# Patient Record
Sex: FEMALE | Race: WHITE | NOT HISPANIC OR LATINO | URBAN - METROPOLITAN AREA
[De-identification: names, ages, dates, MRNs, and addresses within clinical notes are randomized per-mention and may not be internally consistent; named-entity substitution may affect disease eponyms.]

---

## 2021-01-05 ENCOUNTER — INPATIENT (INPATIENT)
Facility: HOSPITAL | Age: 31
LOS: 0 days | Discharge: HOME | End: 2021-01-06
Attending: SPECIALIST | Admitting: SPECIALIST

## 2021-01-05 ENCOUNTER — TRANSCRIPTION ENCOUNTER (OUTPATIENT)
Age: 31
End: 2021-01-05

## 2021-01-05 VITALS — TEMPERATURE: 99 F

## 2021-01-05 DIAGNOSIS — O26.899 OTHER SPECIFIED PREGNANCY RELATED CONDITIONS, UNSPECIFIED TRIMESTER: ICD-10-CM

## 2021-01-05 DIAGNOSIS — Z3A.00 WEEKS OF GESTATION OF PREGNANCY NOT SPECIFIED: ICD-10-CM

## 2021-01-05 LAB
AMPHET UR-MCNC: NEGATIVE — SIGNIFICANT CHANGE UP
BARBITURATES UR SCN-MCNC: NEGATIVE — SIGNIFICANT CHANGE UP
BASOPHILS # BLD AUTO: 0.03 K/UL — SIGNIFICANT CHANGE UP (ref 0–0.2)
BASOPHILS NFR BLD AUTO: 0.2 % — SIGNIFICANT CHANGE UP (ref 0–2)
BENZODIAZ UR-MCNC: NEGATIVE — SIGNIFICANT CHANGE UP
BLD GP AB SCN SERPL QL: NEGATIVE — SIGNIFICANT CHANGE UP
COCAINE METAB.OTHER UR-MCNC: NEGATIVE — SIGNIFICANT CHANGE UP
CREATININE URINE RESULT, DAU: 143 MG/DL — SIGNIFICANT CHANGE UP
EOSINOPHIL # BLD AUTO: 0.06 K/UL — SIGNIFICANT CHANGE UP (ref 0–0.5)
EOSINOPHIL NFR BLD AUTO: 0.5 % — SIGNIFICANT CHANGE UP (ref 0–6)
HCT VFR BLD CALC: 32.3 % — LOW (ref 34.5–45)
HGB BLD-MCNC: 9.7 G/DL — LOW (ref 11.5–15.5)
IANC: 10.44 K/UL — HIGH (ref 1.5–8.5)
IMM GRANULOCYTES NFR BLD AUTO: 0.9 % — SIGNIFICANT CHANGE UP (ref 0–1.5)
LYMPHOCYTES # BLD AUTO: 1.54 K/UL — SIGNIFICANT CHANGE UP (ref 1–3.3)
LYMPHOCYTES # BLD AUTO: 12 % — LOW (ref 13–44)
MCHC RBC-ENTMCNC: 22.1 PG — LOW (ref 27–34)
MCHC RBC-ENTMCNC: 30 GM/DL — LOW (ref 32–36)
MCV RBC AUTO: 73.6 FL — LOW (ref 80–100)
METHADONE UR-MCNC: NEGATIVE — SIGNIFICANT CHANGE UP
MONOCYTES # BLD AUTO: 0.62 K/UL — SIGNIFICANT CHANGE UP (ref 0–0.9)
MONOCYTES NFR BLD AUTO: 4.8 % — SIGNIFICANT CHANGE UP (ref 2–14)
NEUTROPHILS # BLD AUTO: 10.44 K/UL — HIGH (ref 1.8–7.4)
NEUTROPHILS NFR BLD AUTO: 81.6 % — HIGH (ref 43–77)
NRBC # BLD: 0 /100 WBCS — SIGNIFICANT CHANGE UP
NRBC # FLD: 0 K/UL — SIGNIFICANT CHANGE UP
OPIATES UR-MCNC: NEGATIVE — SIGNIFICANT CHANGE UP
OXYCODONE UR-MCNC: NEGATIVE — SIGNIFICANT CHANGE UP
PCP SPEC-MCNC: SIGNIFICANT CHANGE UP
PCP UR-MCNC: NEGATIVE — SIGNIFICANT CHANGE UP
PLATELET # BLD AUTO: 216 K/UL — SIGNIFICANT CHANGE UP (ref 150–400)
RBC # BLD: 4.39 M/UL — SIGNIFICANT CHANGE UP (ref 3.8–5.2)
RBC # FLD: 20.9 % — HIGH (ref 10.3–14.5)
RH IG SCN BLD-IMP: POSITIVE — SIGNIFICANT CHANGE UP
RH IG SCN BLD-IMP: POSITIVE — SIGNIFICANT CHANGE UP
SARS-COV-2 RNA SPEC QL NAA+PROBE: SIGNIFICANT CHANGE UP
T PALLIDUM AB TITR SER: NEGATIVE — SIGNIFICANT CHANGE UP
THC UR QL: NEGATIVE — SIGNIFICANT CHANGE UP
WBC # BLD: 12.8 K/UL — HIGH (ref 3.8–10.5)
WBC # FLD AUTO: 12.8 K/UL — HIGH (ref 3.8–10.5)

## 2021-01-05 RX ORDER — SIMETHICONE 80 MG/1
80 TABLET, CHEWABLE ORAL EVERY 4 HOURS
Refills: 0 | Status: DISCONTINUED | OUTPATIENT
Start: 2021-01-05 | End: 2021-01-05

## 2021-01-05 RX ORDER — MAGNESIUM HYDROXIDE 400 MG/1
30 TABLET, CHEWABLE ORAL
Refills: 0 | Status: DISCONTINUED | OUTPATIENT
Start: 2021-01-05 | End: 2021-01-05

## 2021-01-05 RX ORDER — CITRIC ACID/SODIUM CITRATE 300-500 MG
15 SOLUTION, ORAL ORAL EVERY 6 HOURS
Refills: 0 | Status: DISCONTINUED | OUTPATIENT
Start: 2021-01-05 | End: 2021-01-05

## 2021-01-05 RX ORDER — OXYCODONE HYDROCHLORIDE 5 MG/1
5 TABLET ORAL ONCE
Refills: 0 | Status: DISCONTINUED | OUTPATIENT
Start: 2021-01-05 | End: 2021-01-05

## 2021-01-05 RX ORDER — OXYTOCIN 10 UNIT/ML
333.33 VIAL (ML) INJECTION
Qty: 20 | Refills: 0 | Status: DISCONTINUED | OUTPATIENT
Start: 2021-01-05 | End: 2021-01-05

## 2021-01-05 RX ORDER — DIPHENHYDRAMINE HCL 50 MG
25 CAPSULE ORAL EVERY 6 HOURS
Refills: 0 | Status: DISCONTINUED | OUTPATIENT
Start: 2021-01-05 | End: 2021-01-05

## 2021-01-05 RX ORDER — OXYCODONE HYDROCHLORIDE 5 MG/1
5 TABLET ORAL
Refills: 0 | Status: DISCONTINUED | OUTPATIENT
Start: 2021-01-05 | End: 2021-01-05

## 2021-01-05 RX ORDER — HYDROCORTISONE 1 %
1 OINTMENT (GRAM) TOPICAL EVERY 6 HOURS
Refills: 0 | Status: DISCONTINUED | OUTPATIENT
Start: 2021-01-05 | End: 2021-01-05

## 2021-01-05 RX ORDER — SODIUM CHLORIDE 9 MG/ML
1000 INJECTION, SOLUTION INTRAVENOUS
Refills: 0 | Status: DISCONTINUED | OUTPATIENT
Start: 2021-01-05 | End: 2021-01-05

## 2021-01-05 RX ORDER — LANOLIN
1 OINTMENT (GRAM) TOPICAL EVERY 6 HOURS
Refills: 0 | Status: DISCONTINUED | OUTPATIENT
Start: 2021-01-05 | End: 2021-01-05

## 2021-01-05 RX ORDER — AER TRAVELER 0.5 G/1
1 SOLUTION RECTAL; TOPICAL EVERY 4 HOURS
Refills: 0 | Status: DISCONTINUED | OUTPATIENT
Start: 2021-01-05 | End: 2021-01-05

## 2021-01-05 RX ORDER — BENZOCAINE 10 %
1 GEL (GRAM) MUCOUS MEMBRANE EVERY 6 HOURS
Refills: 0 | Status: DISCONTINUED | OUTPATIENT
Start: 2021-01-05 | End: 2021-01-05

## 2021-01-05 RX ORDER — ACETAMINOPHEN 500 MG
3 TABLET ORAL
Qty: 0 | Refills: 0 | DISCHARGE
Start: 2021-01-05

## 2021-01-05 RX ORDER — DIBUCAINE 1 %
1 OINTMENT (GRAM) RECTAL EVERY 6 HOURS
Refills: 0 | Status: DISCONTINUED | OUTPATIENT
Start: 2021-01-05 | End: 2021-01-05

## 2021-01-05 RX ORDER — PRAMOXINE HYDROCHLORIDE 150 MG/15G
1 AEROSOL, FOAM RECTAL EVERY 4 HOURS
Refills: 0 | Status: DISCONTINUED | OUTPATIENT
Start: 2021-01-05 | End: 2021-01-05

## 2021-01-05 RX ORDER — SODIUM CHLORIDE 9 MG/ML
3 INJECTION INTRAMUSCULAR; INTRAVENOUS; SUBCUTANEOUS EVERY 8 HOURS
Refills: 0 | Status: DISCONTINUED | OUTPATIENT
Start: 2021-01-05 | End: 2021-01-05

## 2021-01-05 RX ORDER — ACETAMINOPHEN 500 MG
975 TABLET ORAL
Refills: 0 | Status: DISCONTINUED | OUTPATIENT
Start: 2021-01-05 | End: 2021-01-05

## 2021-01-05 RX ORDER — TETANUS TOXOID, REDUCED DIPHTHERIA TOXOID AND ACELLULAR PERTUSSIS VACCINE, ADSORBED 5; 2.5; 8; 8; 2.5 [IU]/.5ML; [IU]/.5ML; UG/.5ML; UG/.5ML; UG/.5ML
0.5 SUSPENSION INTRAMUSCULAR ONCE
Refills: 0 | Status: DISCONTINUED | OUTPATIENT
Start: 2021-01-05 | End: 2021-01-05

## 2021-01-05 RX ADMIN — Medication 1000 MILLIUNIT(S)/MIN: at 12:28

## 2021-01-05 RX ADMIN — SODIUM CHLORIDE 3 MILLILITER(S): 9 INJECTION INTRAMUSCULAR; INTRAVENOUS; SUBCUTANEOUS at 23:51

## 2021-01-05 RX ADMIN — SODIUM CHLORIDE 3 MILLILITER(S): 9 INJECTION INTRAMUSCULAR; INTRAVENOUS; SUBCUTANEOUS at 16:56

## 2021-01-05 NOTE — OB PROVIDER H&P - HISTORY OF PRESENT ILLNESS
30y  at 40w1d dated by LMP c/w 12+wks sono presents to triage c/o ctx q 3mins  Reports +FM, no vaginal bleeding, no ROM or LOF  Prenatal care with OB associated with [facility]; Denies any prenatal complications.  GBS: Negative 20

## 2021-01-05 NOTE — DISCHARGE NOTE OB - HOSPITAL COURSE
Patient presented in early labor and had an uncomplicated  followed by an uncomplicated postpartum course. , Hct 32.3 .On postpartum day 1, patient was discharged home in stable condition, voiding spontaneously and with normal vital signs.

## 2021-01-05 NOTE — OB PROVIDER DELIVERY SUMMARY - NSPROVIDERDELIVERYNOTE_OBGYN_ALL_OB_FT
Spontaneous vaginal delivery of liveborn infant from BRAN position. Head, shoulders, and body delivered easily. Loose nuchal cord x1 reduced. Infant was suctioned. No mec. Delayed cord clamping for 5min. Cord clamped and cut and infant was passed to mother. Placenta delivered intact with a 3 vessel cord. Accessory lobe noted. Fundal massage was given and uterine fundus was found to be firm. Vaginal exam revealed an intact cervix, vaginal walls and sulci. Perineum intact. Excellent hemostasis was noted. Patient was stable. Count was correct x 2.    Bertha Gill MD PGY1

## 2021-01-05 NOTE — CHART NOTE - NSCHARTNOTEFT_GEN_A_CORE
R3 Chart Note    Nursing informed me that patient is requested residents not be involved in her care and that delayed cord clamping of 30 minutes be performed. I spoke with the patient at bedside. Discussed that this is a teaching hospital in which residents actively participate in all deliveries and thus residents will be involved in her care. Discussed that attendings will be supervising the residents closely and that we will do our best to take excellent care of her. Also discussed the recommendation for cord clamping closer to 1 minute per our neonatologists. Discussed that if baby is healthy and vigorous delay of slightly longer can be permitted but that delay for 30 minutes is not the recommendation.    LAISHA Becker PGY3  d/w Dr. Monroy
S:  Pt seen and examined for cervical change     O:   T(C): 36.7 (04:31)  HR: 82 (07:34)  BP: 128/65 (07:34)  RR: 16 (04:31)  SpO2: 98% (07:33)  SVE: 5/70/-2, AROM w clear fluid  EFM: baseline 125 mod alexa +accel no decel  [place]: irreg    A/P: 30y P3 presenting in labor  -AROM  - VE at 930a    TLal PGY4

## 2021-01-05 NOTE — OB PROVIDER TRIAGE NOTE - NSHPPHYSICALEXAM_GEN_ALL_CORE
T(C): 37.1 (01-05-21 @ 03:25), Max: 37.1 (01-05-21 @ 03:16)  HR: 86 (01-05-21 @ 03:25) (86 - 86)  BP: 111/74 (01-05-21 @ 03:25) (111/74 - 111/74)  RR: 18 (01-05-21 @ 03:25) (18 - 18)    Heart: RRR  Lungs: CTA  Abdomen: Gravid, soft, NT    NST: Reactive with moderate variability, Category 1 tracing  [place]: Irregular contractions  VE: 4/70/-3, intact membranes

## 2021-01-05 NOTE — DISCHARGE NOTE OB - PATIENT PORTAL LINK FT
You can access the FollowMyHealth Patient Portal offered by [facility] by registering at the following website: [email or web address]. By joining [employer]’s FollowMyHealth portal, you will also be able to view your health information using other applications (apps) compatible with our system.

## 2021-01-05 NOTE — DISCHARGE NOTE OB - CARE PROVIDER_API CALL
ACU,   Ambulatory Clinic Unit, LI, Oncology Building, [facility].  Phone: [phone]  Fax: (   )    -  Follow Up Time:

## 2021-01-05 NOTE — DISCHARGE NOTE OB - COMMUNITY RESOURCE CONTACT NUMBER:
Patient will call to schedule baby's first visit appointment at  [facility] [address] [place], [place] [id number] [phone] so that baby is evaluated by pediatrician 1 to 2 days after hospital discharge.

## 2021-01-05 NOTE — DISCHARGE NOTE OB - COMMUNITY RESOURCE NAME:
Patient  will call to schedule a postpartum  appointment for 4 to 6 weeks after delivery date at [facility]  [phone] .

## 2021-01-05 NOTE — DISCHARGE NOTE OB - PLAN OF CARE
Make your follow-up appointment with your doctor as ordered. Call the clinic to schedule your post-partum follow up appointment in 4-6 weeks. No heavy lifting, driving, or strenuous activity for 6 weeks. Nothing per vagina such as tampons, intercourse, douches or tub baths for 6 weeks or until you see your doctor. Call your doctor with any signs and symptoms of infection such as fever, chills, nausea or vomiting. Call your doctor if you’re unable to tolerate food, increase in vaginal bleeding, or have difficulty urinating. Call your doctor if you have pain that is not relieved by your prescribed medications. Notify your doctor with any other concerns. Call [phone] if you have any of these concerns in the next 6 weeks. recovery

## 2021-01-05 NOTE — OB PROVIDER H&P - NS_PRENATALRECORD_OBGYN_ALL_OB
“You can access the FollowHealth Patient Portal, offered by [facility], by registering with the following website: [email or web address]”
Yes, Full Record

## 2021-01-05 NOTE — OB PROVIDER TRIAGE NOTE - NS_OBGYNHISTORY_OBGYN_ALL_OB_FT
GYN: Denies any h/o STDs, fibroids, ovarian Cyst, or abnormal pap smear  OBH:   - 11 FT  3700 gm  - 1027/13 FT  9lbs 1oz  - 19 FT  9lbs

## 2021-01-05 NOTE — DISCHARGE NOTE OB - CARE PLAN
Principal Discharge DX:	 (normal spontaneous vaginal delivery)  Goal:	recovery  Assessment and plan of treatment:	Make your follow-up appointment with your doctor as ordered. Call the clinic to schedule your post-partum follow up appointment in 4-6 weeks. No heavy lifting, driving, or strenuous activity for 6 weeks. Nothing per vagina such as tampons, intercourse, douches or tub baths for 6 weeks or until you see your doctor. Call your doctor with any signs and symptoms of infection such as fever, chills, nausea or vomiting. Call your doctor if you’re unable to tolerate food, increase in vaginal bleeding, or have difficulty urinating. Call your doctor if you have pain that is not relieved by your prescribed medications. Notify your doctor with any other concerns. Call [phone] if you have any of these concerns in the next 6 weeks.

## 2021-01-05 NOTE — OB PROVIDER H&P - ASSESSMENT
30y  at 40w1d dated by LMP c/w 12+wks sono presents to triage c/o ctx q 3mins  Reports +FM, no vaginal bleeding, no ROM or LOF  Prenatal care with OB associated with [facility]; Denies any prenatal complications.  GBS: Negative 20    GYN: Denies any h/o STDs, fibroids, ovarian Cyst, or abnormal pap smear  OBH:   - 11 FT  3700 gm  - 1027/13 FT  9lbs 1oz  - 19 FT  9lbs  PAST MEDICAL & SURGICAL HISTORY: Denies    Allergies  -Ibuprofen (Anaphylaxis)    MEDICATIONS: PNV    T(C): 37.1 (21 @ 03:25), Max: 37.1 (21 @ 03:16)  HR: 86 (21 @ 03:25) (86 - 86)  BP: 111/74 (21 @ 03:25) (111/74 - 111/74)  RR: 18 (21 @ 03:25) (18 - 18)    Heart: RRR  Lungs: CTA  Abdomen: Gravid, soft, NT    NST: Reactive with moderate variability, Category 1 tracing  [place]: Irregular contractions  VE: 4/70/-3, intact membranes    A/P: 30y  at 40w1d in labor  D/w Dr Monroy  -Admit to labor and delivery  -Pain Management prn  -Cont EFM/[place]  -Admission labs: CBC, RPR, T&S  -IV hydration  -Clear liquid diet  -Urine Toxicology

## 2021-01-05 NOTE — OB RN TRIAGE NOTE - PMH
(normal spontaneous vaginal delivery)  FT 11 wt. 3700 gm  FT 1027/13 wt. 9lbs 1oz  FT 19 wt. 9lbs

## 2021-01-05 NOTE — DISCHARGE NOTE OB - ADDITIONAL INSTRUCTIONS
Please follow-up for postpartum appointment in 4-6 weeks at Ambulatory Clinic Unit, [facility], Oncology [facility], [facility]. Please call the office for an appointment phone # [phone]

## 2021-01-05 NOTE — PROVIDER CONTACT NOTE (OTHER) - ASSESSMENT
Patient denies c/o chest pain or shortness of breath.  Patient denies dizziness or lightheadedness.  [place] rubra and moderate to light.  Patient voided 350ml

## 2021-01-05 NOTE — OB PROVIDER H&P - NSHPPHYSICALEXAM_GEN_ALL_CORE
T(C): 37.1 (01-05-21 @ 03:25), Max: 37.1 (01-05-21 @ 03:16)  HR: 86 (01-05-21 @ 03:25) (86 - 86)  BP: 111/74 (01-05-21 @ 03:25) (111/74 - 111/74)  RR: 18 (01-05-21 @ 03:25) (18 - 18)    Heart: RRR  Lungs: CTA  Abdomen: Gravid, soft, NT    NST: Reactive with moderate variability, Category 1 tracing  [place]: Irregular contractions  VE: 4/70/-3, intact membranes

## 2021-01-05 NOTE — OB PROVIDER TRIAGE NOTE - HISTORY OF PRESENT ILLNESS
30y  at 40w1d dated by LMP c/w 12+wks sono presents to triage c/o ctx q 3mins  Reports +FM, no vaginal bleeding, no ROM or LOF  Prenatal care with OB associated with [facility]; Denies any prenatal complications.  GBS: Negative 20

## 2021-01-05 NOTE — OB RN DELIVERY SUMMARY - NS_SEPSISRSKCALC_OBGYN_ALL_OB_FT
EOS calculated successfully. EOS Risk Factor: 0.5/1000 live births ([facility] national incidence); GA=40w1d; Temp=98.8; ROM=1.783; GBS='Unknown'; Antibiotics='No antibiotics or any antibiotics < 2 hrs prior to birth'

## 2021-01-05 NOTE — OB PROVIDER H&P - PROBLEM SELECTOR PLAN 1
-Admit to labor and delivery  -Pain Management prn  -Cont EFM/[place]  -Admission labs: CBC, RPR, T&S  -IV hydration  -Clear liquid diet  -Urine Toxicology

## 2021-01-05 NOTE — OB PROVIDER TRIAGE NOTE - NSOBPROVIDERNOTE_OBGYN_ALL_OB_FT
30y  at 40w1d dated by LMP c/w 12+wks sono presents to triage c/o ctx q 3mins  Reports +FM, no vaginal bleeding, no ROM or LOF  Prenatal care with OB associated with [facility]; Denies any prenatal complications.  GBS: Negative 20    GYN: Denies any h/o STDs, fibroids, ovarian Cyst, or abnormal pap smear  OBH:   - 11 FT  3700 gm  - 1027/13 FT  9lbs 1oz  - 19 FT  9lbs  PAST MEDICAL & SURGICAL HISTORY: Denies    Allergies  -Ibuprofen (Anaphylaxis)    MEDICATIONS: PNV    T(C): 37.1 (21 @ 03:25), Max: 37.1 (21 @ 03:16)  HR: 86 (21 @ 03:25) (86 - 86)  BP: 111/74 (21 @ 03:25) (111/74 - 111/74)  RR: 18 (21 @ 03:25) (18 - 18)    Heart: RRR  Lungs: CTA  Abdomen: Gravid, soft, NT    NST: Reactive with moderate variability, Category 1 tracing  [place]: Irregular contractions  VE: 4/70/-3, intact membranes    A/P: 30y  at 40w1d in labor  D/w Dr Monroy  -Admit to labor and delivery  -Pain Management prn  -Cont EFM/[place]  -Admission labs: CBC, RPR, T&S  -IV hydration  -Clear liquid diet

## 2021-01-06 VITALS
TEMPERATURE: 99 F | HEART RATE: 92 BPM | SYSTOLIC BLOOD PRESSURE: 106 MMHG | OXYGEN SATURATION: 99 % | RESPIRATION RATE: 18 BRPM | DIASTOLIC BLOOD PRESSURE: 61 MMHG

## 2021-01-06 NOTE — PROGRESS NOTE ADULT - ATTENDING COMMENTS
Associate Chief of L & D (Late entry)     I have not met this patient beffore today.  she received her care at [facility].  She was admitted by Dr. Martinez Thurston in early labor and delivered by Dr Price    OB Progress Note:  PPD#1    S: 29yo  PPD#1 s/p . Patient denies any complaints at this time    O:  Vitals:  Vital Signs Last 24 Hrs  T(C): 37.2 (2021 06:08), Max: 37.6 (2021 12:55)  T(F): 98.9 (2021 06:08), Max: 99.6 (2021 12:55)  HR: 72 (2021 06:08) (72 - 110)  BP: 98/51 (2021 06:08) (80/53 - 131/62)  RR: 18 (2021 06:08) (18 - 20)  SpO2: 98% (2021 06:08) (93% - 99%)    MEDICATIONS  (STANDING):  acetaminophen   Tablet .. 975 milliGRAM(s) Oral <User Schedule>  diphtheria/tetanus/pertussis (acellular) Vaccine (ADAcel) 0.5 milliLiter(s) IntraMuscular once  oxytocin Infusion 333.333 milliUNIT(s)/Min (1000 mL/Hr) IV Continuous <Continuous>  prenatal multivitamin 1 Tablet(s) Oral daily  sodium chloride 0.9% lock flush 3 milliLiter(s) IV Push every 8 hours      Labs:  Blood type: AB Positive  Rubella IgG: RPR: Negative                          9.7<L>   12.80<H> >-----------< 216    (  @ 05:23 )             32.3<L>          Physical Exam:  General: NAD  Abdomen: soft,  fundus firm, NT, mildly distended  Vaginal: Lochia wnl  Extremities: No erythema/ trace edema    A/P: 29yo PPD#1 s/p .   - Patient stable for discharge and will follow up in the PP clinic    Clover Briceno M.D., M.B.A., M.S.

## 2021-01-06 NOTE — PROGRESS NOTE ADULT - SUBJECTIVE AND OBJECTIVE BOX
Patient seen and examined at bedside, no acute overnight events. No acute complaints, pain well controlled. Patient is ambulating, voiding spontaneously, passing gas, and tolerating regular diet. Denies CP, SOB, N/V, HA, blurred vision, epigastric pain. Pt would like to discuss contraception options later today when she is more awake.     Vital Signs Last 24 Hours  T(C): 37.2 (01-06-21 @ 06:08), Max: 37.6 (01-05-21 @ 12:55)  HR: 72 (01-06-21 @ 06:08) (72 - 110)  BP: 98/51 (01-06-21 @ 06:08) (80/53 - 136/82)  RR: 18 (01-06-21 @ 06:08) (18 - 20)  SpO2: 98% (01-06-21 @ 06:08) (93% - 100%)    Physical Exam:  General: NAD  Abdomen: Soft, non-tender, non-distended, fundus firm  Pelvic: Lochia wnl    Labs:    Blood Type: AB Positive  Antibody Screen: Negative  RPR: Negative               9.7    12.80 )-----------( 216      ( 01-05 @ 05:23 )             32.3         MEDICATIONS  (STANDING):  acetaminophen   Tablet .. 975 milliGRAM(s) Oral <User Schedule>  diphtheria/tetanus/pertussis (acellular) Vaccine (ADAcel) 0.5 milliLiter(s) IntraMuscular once  oxytocin Infusion 333.333 milliUNIT(s)/Min (1000 mL/Hr) IV Continuous <Continuous>  prenatal multivitamin 1 Tablet(s) Oral daily  sodium chloride 0.9% lock flush 3 milliLiter(s) IV Push every 8 hours    MEDICATIONS  (PRN):  benzocaine 20%/menthol 0.5% Spray 1 Spray(s) Topical every 6 hours PRN for Perineal discomfort  dibucaine 1% Ointment 1 Application(s) Topical every 6 hours PRN Perineal discomfort  diphenhydrAMINE 25 milliGRAM(s) Oral every 6 hours PRN Pruritus  hydrocortisone 1% Cream 1 Application(s) Topical every 6 hours PRN Moderate Pain (4-6)  lanolin Ointment 1 Application(s) Topical every 6 hours PRN nipple soreness  magnesium hydroxide Suspension 30 milliLiter(s) Oral two times a day PRN Constipation  oxyCODONE    IR 5 milliGRAM(s) Oral every 3 hours PRN Moderate to Severe Pain (4-10)  oxyCODONE    IR 5 milliGRAM(s) Oral once PRN Moderate to Severe Pain (4-10)  pramoxine 1%/zinc 5% Cream 1 Application(s) Topical every 4 hours PRN Moderate Pain (4-6)  simethicone 80 milliGRAM(s) Chew every 4 hours PRN Gas  witch hazel Pads 1 Application(s) Topical every 4 hours PRN Perineal discomfort

## 2021-01-06 NOTE — PROGRESS NOTE ADULT - PROBLEM SELECTOR PLAN 1
- Continue with po analgesia  - Increase ambulation  - Continue regular diet  - IV lock  - No labs  - Pt to be counseled on contraception options later today    Bertha Gill,PGY1

## 2021-01-08 LAB
SARS-COV-2 IGG SERPL QL IA: NEGATIVE — SIGNIFICANT CHANGE UP
SARS-COV-2 IGM SERPL IA-ACNC: <3.8 AU/ML — SIGNIFICANT CHANGE UP

## 2021-04-30 NOTE — OB PROVIDER TRIAGE NOTE - NS_VISITREASON1_OBGYN_ALL_OB
Ophthalmology New Patient   Referred by: Ruby Dyer MD    Chief Complaint:  Diabetic eye exam    HISTORY OF PRESENT ILLNESS: Eleanor Tam is a 55 year old female who is here for one year diabetic eye exam. The patient only wears her glasses for distance or computer work, but would like a new rx today. Patient is a well controlled diabetic, averages around 90 and doesn't notice any vision changes if it fluctuates. Patient denies any new floaters, flashing lights or loss of side VA. Patient has no questions today.      PVD follow with MRX check. Patient states that her vision is better in the OD but still gets pain in the OD when a headache comes on. Slight blurriness with waking in the am.    flsheshes less frequent, floater still present, has changed.    Nose surgery and sinus surgery - at [facility] in [place].      Blood Sugar = 90, result from 4/30/2021    Hemoglobin A1C (%)   Date Value   05/14/2019 7.7 (H)     Tech notes reviewed and edited.    Primary Medical Provider: Ruby Dyer MD       POH:  wears glasses  **eye injury from fall 04/12/19, hit face, SC, broken nose, swelling and bruising    PMH:     PONV (postoperative nausea and vomiting)                      Type 2 diabetes mellitus without complication,* 6/20/2017     Endometriosis                                                   Comment: per patient    Anterior tibialis tendinitis                                    Comment: left leg    Obesity                                                       Social:  reports that she has never smoked. She has never used smokeless tobacco. She reports that she does not drink alcohol and does not use drugs..    PROBLEM LIST:  Patient Active Problem List   Diagnosis   • Migraine   • Type 2 diabetes mellitus without complication, without long-term current use of insulin (CMS/[facility])   • Hyperlipidemia, mixed        Past Surgical History:   Procedure Laterality Date   • Bunionectomy  06/08/2017   • Removal  gallbladder         FH:  I reviewed the technicians history as outlined in EPIC.; there are no additions.  FAMILY OCULAR HX    Glaucoma: no    Retinal detachment: no    Macular degeneration:  no    Amblyopia (lazy eye), strabismus (ET, XT, etc.): no    Eye surgery: no    Other eye diseases: no    Eye meds: None    ALLERGIES:   Allergen Reactions   • Oxycodone VOMITING       Pertinent systemic meds: See EPIC for full med list   none    Review of Systems:  General: No fevers, chills, sweats,   Skin: No rash, bruising, bleeding   Head:  no changes in hearing   Eyes: see HPI.  Chest: No cough, No shortness of breath, chest pain, palpitations   GI/: No abdominal pain, diarrhea, constipation, dysuria, increased frequency of urination, urgency to urinate,   Muscle/Skeletal: No fatigue   Psych: No anxiety, depression, thoughts of hurting self or others   Neuro: No loss of consciousness, numbness or tingling in extremities,   Endocrine: Denies weight changes, changes in thirst, chills, hot or cold intolerance        EXAM:  Mood, Affect:  Alert and oriented x 3, happy.    Not recorded        OCT (optical coherence tomography) INTERPRETATION:  MACULA SCAN    CENTRAL MACULAR THICKNESS:  OD (right eye):  279                                                              OS (left eye):  245    OD (right eye):  ERM over surface normal foveal contour no traction   OS (left eye):  Normal foveal contour, no drusen, no edema          ASSESSMENT/PLAN:    1.  Significant Facial Trauma to Right face - blunt injury, multiple fractures - nose/sinus     - no apparent globe trauma  - no PS    - no traumatic cataract    BCVA - 20/20  - myopic astigmatism       2. Diabetes type 2 - uncontrolled   - usually seen at [facility]   - no BDR (Background diabetic retinopathy)   - cont to control and monitor with PCP   - discussed lowering Hgb A1c< 6.5 to assure no development of DR     3. Incipient Cataracts - mild OU   - Not visually significant at  this time       4. Epiretinal membrane, OD:   - discussed finding with patient including treatment options, concerns, expectations, and risks.  -  Vision currently 20/20 - do nto recc'd intervention   - instructed to call if any changes in vision or distortion becomes worse      I gave and reviewed with the patient an AAO pamphlet on macular pucker.     5. Acute posterior vitreous detachment OD: consistent with the acute floaters/flashes symptoms as described above. There are no signs of retinal tears, retinal detachment, or vitreous hemorrhage on the indented, dilated retinal evaluation today. Counseled: I had a detailed discussion with Ms. Tam on the natural history of posterior vitreous detachment, expectations, concerns, and risks. An AAO handout on the subject was given to her, and all questions were answered.  She will call/return to clinic immediately PRN as instructed.       RTC 1 year diabetic examination     Thank you for allowing me to participate in your patient's care. Please call our office at  [phone] if you have any questions.           Labor at Term

## 2021-09-02 NOTE — OB PROVIDER DELIVERY SUMMARY - NSVAGDELIVERYA_OBGYN_ALL_OB
[facility] ENCOUNTER  FAMILY PRACTICE HISTORY AND PHYSICAL    CHIEF COMPLAINT:  Establish Care       HISTORY OF PRESENT ILLNESS:    Migdalia Sanches is a pleasant 28 year old female who presents today for establish care.     New concerns discussed include:     Vaginal discomfort   - feels itchy but no discharge   - hard not to scratch the area   - has used the same detergents for years  - itching is on the outside   - no associated odor     Chest Pain  - will get intermittent sharp pains on the right side of her chest  - has been going on for the past month and occurs about 1-2x per week  - will be a sharp pain, takes a deep breath and it feels like something cracks and then it goes away  - no CP with exertion     Irregular Cycles    - Was on the Mirena for 10 years and got it removed in November of 2020  - Was having normal periods up until May and hasn't had a period since then  - went to PP the month after and they were negative   - thinks it could be related to weight gain, has gained about 10 pounds in the past year   - no excessive facial hair or acne issues     Patient's last menstrual period was 01/12/2021.     Dandruff  - tried ketoconazole shampoo and selsun blue but neither helped       Patient Care Team:  Sasha Rhodes,  as PCP - General (Family Practice)    PROBLEM LIST:    There is no problem list on file for this patient.      MEDICATIONS:    Current Outpatient Medications   Medication Sig Dispense Refill   • clobetasol (TEMOVATE) 0.05 % cream Apply topically 2 times daily. For 2 weeks followed by once daily for 2 weeks followed by every other day for 2 weeks 60 g 0     No current facility-administered medications for this visit.       ALLERGIES:    ALLERGIES:  No Known Allergies    PAST MEDICAL HISTORY:    History reviewed. No pertinent past medical history.    SURGICAL HISTORY:    History reviewed. No pertinent surgical history.    FAMILY HISTORY:    Family History   Problem Relation Age of  Onset   • Cancer Mother         thyroid    • Anemia Mother    • Asthma Brother    • Diabetes Maternal Grandmother    • Stroke Maternal Grandfather    • Cancer Paternal Grandmother         colon 70's   • Cancer, Skin Maternal Aunt        SOCIAL HISTORY:                 Social History     Tobacco Use   • Smoking status: Never Smoker   • Smokeless tobacco: Never Used   Substance Use Topics   • Alcohol use: Yes     Alcohol/week: 0.0 standard drinks     Comment: once and a while    • Drug use: No       REVIEW OF SYSTEMS:    ROS   Negative aside from what is documented in HPI     PHYSICAL EXAM:    Vitals:    09/02/21 1504   BP: 128/83   BP Location: RUE - Right upper extremity   Patient Position: Sitting   Cuff Size: Large Adult   Pulse: 97   Weight: 105.5 kg   Height: 5' 5\" (1.651 m)   LMP: 01/12/2021     Body mass index is 38.7 kg/m².    Physical Exam  Vitals reviewed. Exam conducted with a chaperone present.   Constitutional:       General: She is not in acute distress.     Appearance: She is not diaphoretic.   HENT:      Head: Normocephalic and atraumatic.   Eyes:      General: No scleral icterus.        Right eye: No discharge.         Left eye: No discharge.      Conjunctiva/sclera: Conjunctivae normal.      Pupils: Pupils are equal, round, and reactive to light.   Pulmonary:      Effort: Pulmonary effort is normal.   Genitourinary:     Vagina: No vaginal discharge.      Comments: Vulvar tissue hyperpigmented and lichenified, excoriated   Skin:     General: Skin is warm and dry.   Neurological:      General: No focal deficit present.      Mental Status: She is alert. Mental status is at baseline.      Gait: Gait is intact.   Psychiatric:         Mood and Affect: Mood and affect normal.         Behavior: Behavior normal.         Thought Content: Thought content normal.         ASSESSMENT/PLAN:    Migdalia was seen today for establish care.    Diagnoses and all orders for this visit:    Encounter to establish care  -  record release for pap smear signed     Screening cholesterol level  -     LIPID PANEL WITH REFLEX; Future    Screening for diabetes mellitus (DM)  -     COMPREHENSIVE METABOLIC PANEL; Future  -     GLYCOHEMOGLOBIN; Future    Secondary amenorrhea- may be related to obesity and weight gain. Pregnancy test is negative. No physical exam signs c/w androgen excess. Will do workup for PCOS.  -     CBC WITH DIFFERENTIAL; Future  -     THYROID STIMULATING HORMONE REFLEX; Future  -     LUTENIZING HORMONE; Future  -     FOLLICLE STIMULATING HORMONE; Future  -     US PELVIS COMPLETE NON OB; Future    Precordial catch syndrome- transient sharp pain in chest with deep breath, only last a couple seconds and occurs once or twice a week. No red flags, sounds c/w precordial catch syndrome.     Lichen simplex chronicus  - recommended eliminating any scented products, detergents, laundry sheets   -     clobetasol (TEMOVATE) 0.05 % cream; Apply topically 2 times daily. For 2 weeks followed by once daily for 2 weeks followed by every other day for 2 weeks    Encounter for pregnancy test, result unknown  -     PREGNANCY TEST, URINE      Return in about 1 year (around 9/2/2022) for WWE .    The patient agreed to and expressed understanding of the assessment and plan and will call or message with any questions or concerns.    Instructions provided as documented in the after visit summary.    Sasha Rhodes DO         Spontaneous

## 2022-11-11 NOTE — OB PROVIDER TRIAGE NOTE - NSPREVIOUSLIVEBIR_OBGYN_ALL_OB
Your Diagnosis is: Finger Laceration    Return to the Emergency department for fever, draining pus, severe pain ,or for any other issues or concerns.    Your new prescriptions are: Keflex    Procedures done today: Laceration Repair, X-ray    Additional instructions:     Keep your stitches dry and covered with a bandage for at least one day. After, you may lightly wash the area with a gentle soap and water in the shower and reapply triple antibiotic (neosporin) along the wound. Do not use hydrogen peroxide to clean the area. Do not submerge your stitches in standing water such as in the bath, a pool, or a hot tub. Keep the wound clean and dry and cover it with a non-adherent bandage during the day. It is normal for clear or yellow fluid to drain from the wound the first few days.    Avoid any sports that may affect your stitches for 1-2 weeks, or until the sutures are removed.     You may take tylenol and ibuprofen for pain relief while the wound heals. You may take these medicines every 4-6 hours.     Return to your PCP, urgent care, or an emergency department for suture removal in 10-14 days.    If you notice a fever (temperature greater than 100.4 degrees), your stitches break resulting in the wound reopening, or you see severe redness and pus draining from the wound please return to the ER for evaluation.  
Yes

## 2023-07-25 NOTE — OB RN PATIENT PROFILE - BREAST MILK PROVIDES PROTECTION AGAINST INFECTION
Statement Selected Rinvoq Pregnancy And Lactation Text: Based on animal studies, Rinvoq may cause embryo-fetal harm when administered to pregnant women.  The medication should not be used in pregnancy.  Breastfeeding is not recommended during treatment and for 6 days after the last dose.

## 2024-11-27 NOTE — OB PROVIDER TRIAGE NOTE - NSFIRSTLIVEBIRTH_OBGYN_ALL_OB_DT
Lab Results   Component Value Date    5COL Yellow 11/27/2024    5UAPP Clear 11/27/2024    5UGLU Negative 11/27/2024    5UBILI Negative 11/27/2024    5UKET Negative 11/27/2024    5USPG 1.000 11/27/2024    5URBC Negative 11/27/2024    5UPH 6.5 11/27/2024    5UPROT Negative 11/27/2024    5UURP 0.2 11/27/2024    POCTUNITR Negative 11/27/2024    5UWBC Negative 11/27/2024      09-Aug-2011